# Patient Record
Sex: FEMALE | Race: WHITE | URBAN - METROPOLITAN AREA
[De-identification: names, ages, dates, MRNs, and addresses within clinical notes are randomized per-mention and may not be internally consistent; named-entity substitution may affect disease eponyms.]

---

## 2022-04-15 RX ORDER — PHENAZOPYRIDINE HYDROCHLORIDE 100 MG/1
100 TABLET, FILM COATED ORAL ONCE
Status: CANCELLED | OUTPATIENT
Start: 2022-04-15 | End: 2022-04-15

## 2022-04-19 ENCOUNTER — ANESTHESIA EVENT (OUTPATIENT)
Dept: SURGERY | Facility: HOSPITAL | Age: 37
End: 2022-04-19
Payer: COMMERCIAL

## 2022-04-19 RX ORDER — SODIUM CHLORIDE, SODIUM LACTATE, POTASSIUM CHLORIDE, CALCIUM CHLORIDE 600; 310; 30; 20 MG/100ML; MG/100ML; MG/100ML; MG/100ML
INJECTION, SOLUTION INTRAVENOUS CONTINUOUS
Status: CANCELLED | OUTPATIENT
Start: 2022-04-19

## 2022-04-20 ENCOUNTER — HOSPITAL ENCOUNTER (OUTPATIENT)
Facility: HOSPITAL | Age: 37
Setting detail: HOSPITAL OUTPATIENT SURGERY
Discharge: HOME | End: 2022-04-20
Attending: OBSTETRICS & GYNECOLOGY | Admitting: OBSTETRICS & GYNECOLOGY
Payer: COMMERCIAL

## 2022-04-20 ENCOUNTER — ANESTHESIA (OUTPATIENT)
Dept: SURGERY | Facility: HOSPITAL | Age: 37
End: 2022-04-20
Payer: COMMERCIAL

## 2022-04-20 VITALS
RESPIRATION RATE: 18 BRPM | BODY MASS INDEX: 30.11 KG/M2 | WEIGHT: 176.38 LBS | SYSTOLIC BLOOD PRESSURE: 128 MMHG | HEART RATE: 81 BPM | OXYGEN SATURATION: 96 % | TEMPERATURE: 98 F | HEIGHT: 64 IN | DIASTOLIC BLOOD PRESSURE: 74 MMHG

## 2022-04-20 DIAGNOSIS — Z20.822 ENCOUNTER FOR PREOPERATIVE SCREENING LABORATORY TESTING FOR COVID-19 VIRUS: Primary | ICD-10-CM

## 2022-04-20 DIAGNOSIS — Z01.812 ENCOUNTER FOR PREOPERATIVE SCREENING LABORATORY TESTING FOR COVID-19 VIRUS: Primary | ICD-10-CM

## 2022-04-20 DIAGNOSIS — D21.9 FIBROID: ICD-10-CM

## 2022-04-20 LAB
B-HCG UR QL: NEGATIVE
SARS-COV-2 RNA RESP QL NAA+PROBE: NOT DETECTED

## 2022-04-20 PROCEDURE — 87205 SMEAR GRAM STAIN: CPT | Performed by: OBSTETRICS & GYNECOLOGY

## 2022-04-20 PROCEDURE — 81025 URINE PREGNANCY TEST: CPT

## 2022-04-20 PROCEDURE — 87075 CULTR BACTERIA EXCEPT BLOOD: CPT | Performed by: OBSTETRICS & GYNECOLOGY

## 2022-04-20 PROCEDURE — 0UB94ZZ EXCISION OF UTERUS, PERCUTANEOUS ENDOSCOPIC APPROACH: ICD-10-PCS | Performed by: OBSTETRICS & GYNECOLOGY

## 2022-04-20 PROCEDURE — 0UJ88ZZ INSPECTION OF FALLOPIAN TUBE, VIA NATURAL OR ARTIFICIAL OPENING ENDOSCOPIC: ICD-10-PCS | Performed by: OBSTETRICS & GYNECOLOGY

## 2022-04-20 PROCEDURE — 87070 CULTURE OTHR SPECIMN AEROBIC: CPT | Performed by: OBSTETRICS & GYNECOLOGY

## 2022-04-20 PROCEDURE — 88305 TISSUE EXAM BY PATHOLOGIST: CPT | Performed by: OBSTETRICS & GYNECOLOGY

## 2022-04-20 DEVICE — INTERCEED: Type: IMPLANTABLE DEVICE | Site: PELVIS | Status: FUNCTIONAL

## 2022-04-20 DEVICE — KIT TISS CLOS TISSEEL 4ML: Type: IMPLANTABLE DEVICE | Site: PELVIS | Status: FUNCTIONAL

## 2022-04-20 RX ORDER — HYDROCODONE BITARTRATE AND ACETAMINOPHEN 5; 325 MG/1; MG/1
1 TABLET ORAL AS NEEDED
Status: COMPLETED | OUTPATIENT
Start: 2022-04-20 | End: 2022-04-20

## 2022-04-20 RX ORDER — CEFAZOLIN SODIUM 1 G/3ML
2 INJECTION, POWDER, FOR SOLUTION INTRAMUSCULAR; INTRAVENOUS ONCE
Status: DISCONTINUED | OUTPATIENT
Start: 2022-04-20 | End: 2022-04-20

## 2022-04-20 RX ORDER — ONDANSETRON 2 MG/ML
4 INJECTION INTRAMUSCULAR; INTRAVENOUS AS NEEDED
Status: DISCONTINUED | OUTPATIENT
Start: 2022-04-20 | End: 2022-04-20

## 2022-04-20 RX ORDER — GLYCOPYRROLATE 0.2 MG/ML
INJECTION, SOLUTION INTRAMUSCULAR; INTRAVENOUS AS NEEDED
Status: DISCONTINUED | OUTPATIENT
Start: 2022-04-20 | End: 2022-04-20 | Stop reason: SURG

## 2022-04-20 RX ORDER — SODIUM CHLORIDE, SODIUM LACTATE, POTASSIUM CHLORIDE, CALCIUM CHLORIDE 600; 310; 30; 20 MG/100ML; MG/100ML; MG/100ML; MG/100ML
INJECTION, SOLUTION INTRAVENOUS CONTINUOUS
Status: DISCONTINUED | OUTPATIENT
Start: 2022-04-20 | End: 2022-04-20

## 2022-04-20 RX ORDER — CEFAZOLIN SODIUM/WATER 2 G/20 ML
2 SYRINGE (ML) INTRAVENOUS ONCE
Status: COMPLETED | OUTPATIENT
Start: 2022-04-20 | End: 2022-04-20

## 2022-04-20 RX ORDER — PHENAZOPYRIDINE HYDROCHLORIDE 200 MG/1
200 TABLET, FILM COATED ORAL ONCE
Status: COMPLETED | OUTPATIENT
Start: 2022-04-20 | End: 2022-04-20

## 2022-04-20 RX ORDER — MEPERIDINE HYDROCHLORIDE 25 MG/ML
12.5 INJECTION INTRAMUSCULAR; INTRAVENOUS; SUBCUTANEOUS AS NEEDED
Status: DISCONTINUED | OUTPATIENT
Start: 2022-04-20 | End: 2022-04-20

## 2022-04-20 RX ORDER — METOCLOPRAMIDE HYDROCHLORIDE 5 MG/ML
INJECTION INTRAMUSCULAR; INTRAVENOUS AS NEEDED
Status: DISCONTINUED | OUTPATIENT
Start: 2022-04-20 | End: 2022-04-20 | Stop reason: SURG

## 2022-04-20 RX ORDER — HYDROMORPHONE HYDROCHLORIDE 1 MG/ML
INJECTION, SOLUTION INTRAMUSCULAR; INTRAVENOUS; SUBCUTANEOUS
Status: COMPLETED
Start: 2022-04-20 | End: 2022-04-20

## 2022-04-20 RX ORDER — VASOPRESSIN 20 U/ML
INJECTION PARENTERAL AS NEEDED
Status: DISCONTINUED | OUTPATIENT
Start: 2022-04-20 | End: 2022-04-20 | Stop reason: HOSPADM

## 2022-04-20 RX ORDER — DEXAMETHASONE SODIUM PHOSPHATE 4 MG/ML
VIAL (ML) INJECTION AS NEEDED
Status: DISCONTINUED | OUTPATIENT
Start: 2022-04-20 | End: 2022-04-20 | Stop reason: SURG

## 2022-04-20 RX ORDER — ACETAMINOPHEN 500 MG
1000 TABLET ORAL ONCE
Status: DISCONTINUED | OUTPATIENT
Start: 2022-04-20 | End: 2022-04-20 | Stop reason: HOSPADM

## 2022-04-20 RX ORDER — CEFAZOLIN SODIUM/WATER 2 G/20 ML
SYRINGE (ML) INTRAVENOUS
Status: DISCONTINUED
Start: 2022-04-20 | End: 2022-04-20

## 2022-04-20 RX ORDER — NALOXONE HYDROCHLORIDE 0.4 MG/ML
80 INJECTION, SOLUTION INTRAMUSCULAR; INTRAVENOUS; SUBCUTANEOUS AS NEEDED
Status: DISCONTINUED | OUTPATIENT
Start: 2022-04-20 | End: 2022-04-20

## 2022-04-20 RX ORDER — KETOROLAC TROMETHAMINE 30 MG/ML
INJECTION, SOLUTION INTRAMUSCULAR; INTRAVENOUS AS NEEDED
Status: DISCONTINUED | OUTPATIENT
Start: 2022-04-20 | End: 2022-04-20 | Stop reason: SURG

## 2022-04-20 RX ORDER — ONDANSETRON 2 MG/ML
INJECTION INTRAMUSCULAR; INTRAVENOUS AS NEEDED
Status: DISCONTINUED | OUTPATIENT
Start: 2022-04-20 | End: 2022-04-20 | Stop reason: SURG

## 2022-04-20 RX ORDER — ACETAMINOPHEN 500 MG
1000 TABLET ORAL ONCE AS NEEDED
Status: DISCONTINUED | OUTPATIENT
Start: 2022-04-20 | End: 2022-04-20

## 2022-04-20 RX ORDER — LIDOCAINE HYDROCHLORIDE 10 MG/ML
INJECTION, SOLUTION EPIDURAL; INFILTRATION; INTRACAUDAL; PERINEURAL AS NEEDED
Status: DISCONTINUED | OUTPATIENT
Start: 2022-04-20 | End: 2022-04-20 | Stop reason: SURG

## 2022-04-20 RX ORDER — MIDAZOLAM HYDROCHLORIDE 1 MG/ML
INJECTION INTRAMUSCULAR; INTRAVENOUS AS NEEDED
Status: DISCONTINUED | OUTPATIENT
Start: 2022-04-20 | End: 2022-04-20 | Stop reason: SURG

## 2022-04-20 RX ORDER — ROCURONIUM BROMIDE 10 MG/ML
INJECTION, SOLUTION INTRAVENOUS AS NEEDED
Status: DISCONTINUED | OUTPATIENT
Start: 2022-04-20 | End: 2022-04-20 | Stop reason: SURG

## 2022-04-20 RX ORDER — KETAMINE HYDROCHLORIDE 50 MG/ML
INJECTION, SOLUTION, CONCENTRATE INTRAMUSCULAR; INTRAVENOUS AS NEEDED
Status: DISCONTINUED | OUTPATIENT
Start: 2022-04-20 | End: 2022-04-20 | Stop reason: SURG

## 2022-04-20 RX ORDER — NEOSTIGMINE METHYLSULFATE 1 MG/ML
INJECTION INTRAVENOUS AS NEEDED
Status: DISCONTINUED | OUTPATIENT
Start: 2022-04-20 | End: 2022-04-20 | Stop reason: SURG

## 2022-04-20 RX ORDER — HYDROCODONE BITARTRATE AND ACETAMINOPHEN 5; 325 MG/1; MG/1
2 TABLET ORAL AS NEEDED
Status: COMPLETED | OUTPATIENT
Start: 2022-04-20 | End: 2022-04-20

## 2022-04-20 RX ORDER — HYDROMORPHONE HYDROCHLORIDE 1 MG/ML
0.4 INJECTION, SOLUTION INTRAMUSCULAR; INTRAVENOUS; SUBCUTANEOUS EVERY 5 MIN PRN
Status: DISCONTINUED | OUTPATIENT
Start: 2022-04-20 | End: 2022-04-20

## 2022-04-20 RX ORDER — BUPIVACAINE HYDROCHLORIDE 5 MG/ML
INJECTION, SOLUTION EPIDURAL; INTRACAUDAL AS NEEDED
Status: DISCONTINUED | OUTPATIENT
Start: 2022-04-20 | End: 2022-04-20 | Stop reason: HOSPADM

## 2022-04-20 RX ORDER — PHENYLEPHRINE HCL 10 MG/ML
VIAL (ML) INJECTION AS NEEDED
Status: DISCONTINUED | OUTPATIENT
Start: 2022-04-20 | End: 2022-04-20 | Stop reason: SURG

## 2022-04-20 RX ADMIN — KETAMINE HYDROCHLORIDE 25 MG: 50 INJECTION, SOLUTION, CONCENTRATE INTRAMUSCULAR; INTRAVENOUS at 07:45:00

## 2022-04-20 RX ADMIN — CEFAZOLIN SODIUM/WATER 2 G: 2 G/20 ML SYRINGE (ML) INTRAVENOUS at 07:45:00

## 2022-04-20 RX ADMIN — NEOSTIGMINE METHYLSULFATE 2 MG: 1 INJECTION INTRAVENOUS at 10:16:00

## 2022-04-20 RX ADMIN — PHENYLEPHRINE HCL 50 MCG: 10 MG/ML VIAL (ML) INJECTION at 07:52:00

## 2022-04-20 RX ADMIN — ROCURONIUM BROMIDE 10 MG: 10 INJECTION, SOLUTION INTRAVENOUS at 09:00:00

## 2022-04-20 RX ADMIN — ROCURONIUM BROMIDE 20 MG: 10 INJECTION, SOLUTION INTRAVENOUS at 08:11:00

## 2022-04-20 RX ADMIN — KETOROLAC TROMETHAMINE 30 MG: 30 INJECTION, SOLUTION INTRAMUSCULAR; INTRAVENOUS at 10:16:00

## 2022-04-20 RX ADMIN — SODIUM CHLORIDE, SODIUM LACTATE, POTASSIUM CHLORIDE, CALCIUM CHLORIDE: 600; 310; 30; 20 INJECTION, SOLUTION INTRAVENOUS at 10:20:00

## 2022-04-20 RX ADMIN — MIDAZOLAM HYDROCHLORIDE 2 MG: 1 INJECTION INTRAMUSCULAR; INTRAVENOUS at 07:32:00

## 2022-04-20 RX ADMIN — DEXAMETHASONE SODIUM PHOSPHATE 4 MG: 4 MG/ML VIAL (ML) INJECTION at 07:45:00

## 2022-04-20 RX ADMIN — ONDANSETRON 4 MG: 2 INJECTION INTRAMUSCULAR; INTRAVENOUS at 07:45:00

## 2022-04-20 RX ADMIN — SODIUM CHLORIDE, SODIUM LACTATE, POTASSIUM CHLORIDE, CALCIUM CHLORIDE: 600; 310; 30; 20 INJECTION, SOLUTION INTRAVENOUS at 07:32:00

## 2022-04-20 RX ADMIN — GLYCOPYRROLATE 0.4 MG: 0.2 INJECTION, SOLUTION INTRAMUSCULAR; INTRAVENOUS at 10:16:00

## 2022-04-20 RX ADMIN — METOCLOPRAMIDE HYDROCHLORIDE 10 MG: 5 INJECTION INTRAMUSCULAR; INTRAVENOUS at 07:45:00

## 2022-04-20 RX ADMIN — ROCURONIUM BROMIDE 50 MG: 10 INJECTION, SOLUTION INTRAVENOUS at 07:36:00

## 2022-04-20 RX ADMIN — LIDOCAINE HYDROCHLORIDE 50 MG: 10 INJECTION, SOLUTION EPIDURAL; INFILTRATION; INTRACAUDAL; PERINEURAL at 07:32:00

## 2022-04-20 NOTE — BRIEF OP NOTE
Pre-Operative Diagnosis: FIBROID     Post-Operative Diagnosis: FIBROID      Procedure Performed:   LAPAROSCOPY, HYSTEROSCOPY, MYOMECTOMY, enterolysis, right ureterolysis, MORCELLATION IN A CONTAINMENT SYSTEM,, TUBAL LAVAGE    Surgeon(s) and Role:     * Ashley Venegas MD - Primary     * Yelitza White MD - Assisting Surgeon    Assistant(s):  RNFA: Adrian Salazar RN     Surgical Findings: large fibroid   Specimen:fibroid / culture of pelvic fluid     Estimated Blood Loss: 30cc        Md Alicia Carias MD  4/20/2022  8:34 AM

## 2022-04-20 NOTE — ANESTHESIA PROCEDURE NOTES
Airway  Date/Time: 4/20/2022 7:37 AM  Urgency: elective      General Information and Staff    Patient location during procedure: OR  Anesthesiologist: Parish Bearden MD  Performed: anesthesiologist     Indications and Patient Condition  Indications for airway management: anesthesia  Spontaneous Ventilation: absent  Sedation level: deep  Preoxygenated: yes  Patient position: sniffing  Mask difficulty assessment: 1 - vent by mask    Final Airway Details  Final airway type: endotracheal airway      Successful airway: ETT  Cuffed: yes   Successful intubation technique: Video laryngoscopy  Endotracheal tube insertion site: oral  Blade size: #3  ETT size (mm): 7.0    Cormack-Lehane Classification: grade I - full view of glottis  Placement verified by: chest auscultation and capnometry   Cuff volume (mL): 5  Measured from: lips  ETT to lips (cm): 22  Number of attempts at approach: 1    Additional Comments  VC clean, passed smoothly, atraumatically. OGT and soft bite block to molars.  Dentition unchanged

## 2022-04-20 NOTE — INTERVAL H&P NOTE
Pre-op Diagnosis: FIBROID    The above referenced H&P was reviewed by Charu Jiang MD on 4/20/2022, the patient was examined and no significant changes have occurred in the patient's condition since the H&P was performed. I discussed with the patient and/or legal representative the potential benefits, risks and side effects of this procedure; the likelihood of the patient achieving goals; and potential problems that might occur during recuperation. I discussed reasonable alternatives to the procedure, including risks, benefits and side effects related to the alternatives and risks related to not receiving this procedure. We will proceed with procedure as planned.

## 2022-04-21 NOTE — OPERATIVE REPORT
[facility]    PATIENT'S NAME: Daria Mcnally   ATTENDING PHYSICIAN: Rendell Severin, M.D. OPERATING PHYSICIAN: Rendell Severin, M.D. PATIENT ACCOUNT#:   [de-identified]    LOCATION:  [address]  MEDICAL RECORD #:   [id number]       YOB: 1985  ADMISSION DATE:       04/20/2022      OPERATION DATE:  04/20/2022    OPERATIVE REPORT    PREOPERATIVE DIAGNOSIS:  1. Symptomatic uterine fibroids. 2.   Isthmocele. POSTOPERATIVE DIAGNOSIS:  Symptomatic uterine fibroid, pelvic adhesions, bowel adhesions. PROCEDURE:  Examination under anesthesia, hysteroscopy, operative laparoscopy, myomectomy, lysis of adhesions, right ureterolysis, enterolysis, cystoscopy. Note: We had to perform right ureterolysis as well cystoscopy because of dense adhesions from the fibroid to the right pelvic sidewall. In addition, there were noted to be loops of bowel adhesed. Ultimately, we were able to completely mobilize the fibroid and to take down the adhesions between loops of bowel. Again, however, we had to perform right ureterolysis and eventually cystoscopy because of the dense adhesions from the fibroid to the right pelvic sidewall. ANESTHESIA:  The procedure was performed under general endotracheal anesthesia. ESTIMATED BLOOD LOSS:  30 mL. COMPLICATIONS:  There were no complications. FINDINGS:  On examination under anesthesia, there was noted to be right adnexal thickening. The uterus appeared to be normal size. Uterus sounds to 8 cm. Hysteroscopy revealed normal uterine cavity. Both tubal ostia were noted to be normal.  There was evidence of a minimal isthmocele of no clinical concern. It was minimal.  On operative laparoscopy, there was noted to be a normal liver edge, gallbladder. The appendix was adherent to a 10 cm pedunculated fibroid. We were able to easily mobilize the appendix. The fibroid was densely adherent to the right pelvic sidewall.   Ultimately, we had to dissect this off. In order to do this, we had to perform right ureterolysis. Loops of bowel were adherent to one another. We could also see normal adnexa bilaterally. Moreover, outside of the fibroid, the uterus appeared totally normal.  There was no evidence of endometriosis. Cystoscopy performed at the end of the procedure showed a normal bladder. Urine came from both ureteral orifices. OPERATIVE TECHNIQUE:  The patient was intubated for the purpose of general endotracheal anesthesia, prepped and draped in usual manner for laparoscopy. Bladder catheterized. Patient examined under anesthesia with findings noted above. Weighted speculum introduced into the vaginal vault. The anterior lip of the cervix was grasped with a single-tooth tenaculum. Uterus sounds to 8 cm. Hysteroscopy was now performed with findings noted above. Because of the very small size of the isthmocele and no inflammation noted, no further surgery was performed on the isthmocele. We did not attempt to shave it. Attention now directed toward the umbilicus. The umbilicus was everted with an Allis clamp. Two towel clamps were placed on either side of the umbilicus. A small rent made mid umbilical with a #59 blade. Through this, a Veress needle was placed. Initially, we tried to insufflate with the Veress needle at the umbilicus. We were unsuccessful, so we made a left upper quadrant incision right underneath the last rib and insufflated in the midclavicular line. Once the pressure reached 14 mmHg, we used a Visiport at the left upper quadrant incision to gain entrance to the abdomen. Now, ports were placed at the umbilicus and 2 laterally. Findings were as noted above. The fibroid was noted to be pedunculated. It was posterior area. We came across this attachment to the uterus. It was realized that the fibroid was densely adherent to the right pelvis.   In the process of dissecting off the right pelvis, we did get into the fibroid itself, and yellow and purulent-appearing fluid was evacuated. It was obvious that the fibroid was densely adherent to the right pelvic sidewall. Thus, we performed right ureterolysis in order to keep the ureter out of harm's way, as we had to resect this area with energy. Meticulous hemostasis was noted. Ultimately, we were able to remove the entire fibroid mass. Now, adhesions between loops of bowel were lysed as well. Ultimately, the fibroid was now placed in a bag which had been brought into the abdomen through the umbilicus. Once this fibroid was in the bag, the mouth of the bag was removed via the umbilicus. Because the fibroid was so degenerated, we were able to just take the fibroid out at the umbilicus piecemeal.  At the end of the procedure, the pelvis was thoroughly irrigated. Previously, we did send the fluid from the fibroid for culture. We now repaired the back of the uterus with 3-0 V-Loc suture in 2 layers. We placed Interceed over the pelvic sidewall and the posterior uterus. Now, cystoscopy was performed. Pyridium-stained urine came from both ureteral orifices showing intact ureteral function. Now, the abdomen was once again suctioned, incision closed in the usual fashion. Patient was extubated and taken to recovery room in satisfactory condition.     Dictated By Rizwan Gray M.D.  d: 04/20/2022 11:08:59  t: 04/20/2022 67:63:22  Job [id number]  FZ/

## (undated) DEVICE — BAG DRAIN INFECTION CNTRL 2000

## (undated) DEVICE — ADHESIVE MASTISOL 2/3CC VL

## (undated) DEVICE — TROCAR: Brand: KII FIOS FIRST ENTRY

## (undated) DEVICE — CUSH POSI ELBO ULN NRV 22X11X8

## (undated) DEVICE — TUBING CYSTO TUR DUAL

## (undated) DEVICE — TROCAR: Brand: KII® SLEEVE

## (undated) DEVICE — 3M™ STERI-STRIP™ REINFORCED ADHESIVE SKIN CLOSURES, R1547, 1/2 IN X 4 IN (12 MM X 100 MM), 6 STRIPS/ENVELOPE: Brand: 3M™ STERI-STRIP™

## (undated) DEVICE — INSUFFLATION NEEDLE TO ESTABLISH PNEUMOPERITONEUM.: Brand: INSUFFLATION NEEDLE

## (undated) DEVICE — STERILE POLYISOPRENE POWDER-FREE SURGICAL GLOVES: Brand: PROTEXIS

## (undated) DEVICE — INFLOWHYSTER S&N

## (undated) DEVICE — DUPLOSPRAY MIS APPLICATOR

## (undated) DEVICE — [HIGH FLOW INSUFFLATOR,  DO NOT USE IF PACKAGE IS DAMAGED,  KEEP DRY,  KEEP AWAY FROM SUNLIGHT,  PROTECT FROM HEAT AND RADIOACTIVE SOURCES.]: Brand: PNEUMOSURE

## (undated) DEVICE — LIGHT HANDLE

## (undated) DEVICE — SUTURE VLOC 180 3-0 9" 0344

## (undated) DEVICE — TROCAR: Brand: KII SHIELDED BLADED ACCESS SYSTEM

## (undated) DEVICE — PKS LYONS DISSECTING FORCEPS 5MM/33CM: Brand: PK TECHNOLOGY

## (undated) DEVICE — SOLUTION SURG DURA PREP HAZMAT

## (undated) DEVICE — SCD SLEEVE KNEE HI BLEND

## (undated) DEVICE — PLUMEPORT ACTIV LAPAROSCOPIC SMOKE FILTRATION DEVICE: Brand: PLUMEPORT ACTIVE

## (undated) DEVICE — GYN LAP/ROBOTIC: Brand: MEDLINE INDUSTRIES, INC.

## (undated) DEVICE — SOL LACT RINGERS 1000ML

## (undated) DEVICE — SPECIMEN TRAP LUKI

## (undated) DEVICE — VISUALIZATION SYSTEM: Brand: CLEARIFY

## (undated) DEVICE — SOL  .9 3000ML

## (undated) DEVICE — 40580 - THE PINK PAD - ADVANCED TRENDELENBURG POSITIONING KIT: Brand: 40580 - THE PINK PAD - ADVANCED TRENDELENBURG POSITIONING KIT

## (undated) DEVICE — SOL  .9 1000ML BTL

## (undated) DEVICE — DECANTER BAG 9": Brand: MEDLINE INDUSTRIES, INC.

## (undated) DEVICE — LAPAROSCOPIC TISSUE RETRIEVAL SAC FOR USE WITH MINIMALLY INVASIVE PROCEDURES: Brand: ESPINER TISSUE RETRIEVAL SYSTEM

## (undated) DEVICE — CUTTING ELECTRODE BIPO 21FR  FOR HYSTEROSCOPY, FOR CONTINUOUS IRRIGATION SHEATHS, 21, FR, LOOP: ROUND, WIRE Ø 0.3MM, FORK COLOR TRANSPARENT, STEM COLOR BLUE, PACK=3 PCS, FOR USE IN COMBINATION WITH THE PRINCESS RESECTOSCOPE, STERILE, FOR SINGLE USE: Brand: PRINCESS

## (undated) DEVICE — SUTURE MONOCRYL 4-0 PS-2

## (undated) DEVICE — SUTURE VICRYL 0 UR-6

## (undated) DEVICE — NEEDLE SPINAL 18X6 408360

## (undated) DEVICE — SUTURE SILK 0 KS

## (undated) DEVICE — HARMONIC 1100 SHEARS, 36CM SHAFT LENGTH: Brand: HARMONIC

## (undated) NOTE — LETTER
OUTSIDE TESTING RESULT REQUEST     IMPORTANT: FOR YOUR IMMEDIATE ATTENTION  Please FAX all test results listed below to: [phone]     Testing already done on or about: 2022     * * * * If testing is NOT complete, arrange with patient A.S.A.P. * * * *      Patient Name: Susen Osgood  Surgery Date: 2022  CSN: [id number]  Medical Record: [id number]   : 1985 - A: 40 y      Sex: female  Surgeon(s):  Luzma Hanson MD  Procedure: LAPAROSCOPY, HYSTEROSCOPY, MYOMECTOMY, MORCELLATION IN A CONTAINMENT SYSTEM, DOUBTFUL SHAVING OF  DEFECT, TUBAL LAVAGE  Anesthesia Type: General     Surgeon: Luzma Hanson MD     The following Testing and Time Line are REQUIRED PER ANESTHESIA     CBC [with Differential & Platelets] within  30 days  CMP (requires 4 hour fast) within  30 days  Hepatitis B Antigen   Hepatitis C (HCV Antibody)  HIV 1/2 Single Assay       Thank You,   Sent by: Dagoberto Kehr RN